# Patient Record
Sex: MALE | Race: BLACK OR AFRICAN AMERICAN | NOT HISPANIC OR LATINO | URBAN - METROPOLITAN AREA
[De-identification: names, ages, dates, MRNs, and addresses within clinical notes are randomized per-mention and may not be internally consistent; named-entity substitution may affect disease eponyms.]

---

## 2021-04-04 ENCOUNTER — EMERGENCY (EMERGENCY)
Facility: HOSPITAL | Age: 26
LOS: 0 days | Discharge: HOME | End: 2021-04-04
Payer: COMMERCIAL

## 2021-04-04 VITALS
OXYGEN SATURATION: 97 % | HEART RATE: 82 BPM | WEIGHT: 229.94 LBS | RESPIRATION RATE: 17 BRPM | TEMPERATURE: 97 F | SYSTOLIC BLOOD PRESSURE: 127 MMHG | HEIGHT: 69 IN | DIASTOLIC BLOOD PRESSURE: 83 MMHG

## 2021-04-04 DIAGNOSIS — S63.286A DISLOCATION OF PROXIMAL INTERPHALANGEAL JOINT OF RIGHT LITTLE FINGER, INITIAL ENCOUNTER: ICD-10-CM

## 2021-04-04 DIAGNOSIS — M79.644 PAIN IN RIGHT FINGER(S): ICD-10-CM
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
## 2021-04-04 DIAGNOSIS — J45.909 UNSPECIFIED ASTHMA, UNCOMPLICATED: ICD-10-CM
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
## 2021-04-04 PROCEDURE — 73130 X-RAY EXAM OF HAND: CPT | Mod: 26,RT

## 2021-04-04 PROCEDURE — 26770 TREAT FINGER DISLOCATION: CPT | Mod: 54

## 2021-04-04 PROCEDURE — 99284 EMERGENCY DEPT VISIT MOD MDM: CPT | Mod: 57

## 2021-04-04 NOTE — ED PROVIDER NOTE - CHIEF COMPLAINT
The patient is a 25y Male complaining of foot pain/injury. The patient is a 25y Male complaining of finger pain/injury.

## 2021-04-04 NOTE — ED ADULT TRIAGE NOTE - CHIEF COMPLAINT QUOTE
PW with R pinky injury/edema. Pt states jammed finger while playing basketball. sent by  for evaluation.

## 2021-04-04 NOTE — ED PROVIDER NOTE - OBJECTIVE STATEMENT
24 y/o M obese with no PMHX presents to ED c/o right pinky finger pain pt stated that he jammed it while playing basketball no asociated symptoms denies numbness and other concerns.

## 2021-04-04 NOTE — ED PROVIDER NOTE - MUSCULOSKELETAL, MLM
Spine appears normal, right pinky TTP at the PIP with limited ROM and strength due to pain sensation, NVI

## 2021-04-04 NOTE — ED PROVIDER NOTE - PATIENT PORTAL LINK FT
You can access the FollowMyHealth Patient Portal offered by [facility] by registering at the following website: [email or web address]. By joining [employer]’s FollowMyHealth portal, you will also be able to view your health information using other applications (apps) compatible with our system.
